# Patient Record
Sex: FEMALE | Race: ASIAN | URBAN - METROPOLITAN AREA
[De-identification: names, ages, dates, MRNs, and addresses within clinical notes are randomized per-mention and may not be internally consistent; named-entity substitution may affect disease eponyms.]

---

## 2018-06-05 ENCOUNTER — APPOINTMENT (RX ONLY)
Dept: URBAN - METROPOLITAN AREA CLINIC 81 | Facility: CLINIC | Age: 38
Setting detail: DERMATOLOGY
End: 2018-06-05

## 2018-06-05 DIAGNOSIS — L68.9 HYPERTRICHOSIS, UNSPECIFIED: ICD-10-CM

## 2018-06-05 PROCEDURE — ? LASER HAIR REMOVAL

## 2018-06-05 ASSESSMENT — LOCATION DETAILED DESCRIPTION DERM
LOCATION DETAILED: RIGHT LATERAL BUCCAL CHEEK
LOCATION DETAILED: LEFT SUPERIOR ANTERIOR NECK
LOCATION DETAILED: RIGHT LOWER CUTANEOUS LIP
LOCATION DETAILED: LEFT LATERAL BUCCAL CHEEK

## 2018-06-05 ASSESSMENT — LOCATION ZONE DERM
LOCATION ZONE: NECK
LOCATION ZONE: LIP
LOCATION ZONE: FACE

## 2018-06-05 ASSESSMENT — LOCATION SIMPLE DESCRIPTION DERM
LOCATION SIMPLE: RIGHT CHEEK
LOCATION SIMPLE: LEFT ANTERIOR NECK
LOCATION SIMPLE: RIGHT LIP
LOCATION SIMPLE: LEFT CHEEK

## 2018-06-05 NOTE — PROCEDURE: LASER HAIR REMOVAL
Pulse Duration: 10 ms
Treatment Number: 0
Spot Size: 12 mm
Consent: Written consent obtained, risks reviewed including but not limited to crusting, scabbing, blistering, scarring, darker or lighter pigmentary change, paradoxical hair regrowth, incomplete removal of hair and infection.
Spot Size: 1.5 mm
Pre-Procedure: Prior to proceeding the treatment areas were cleaned and all present put on their eye protection.
Post-Care Instructions: I reviewed with the patient in detail post-care instructions. Patient should avoid sun for a minimum of 4 weeks before and after treatment.
Fluence (Will Not Render If 0): 20
Render Post-Care In The Note: No
Detail Level: Zone
Post-Procedure Care: Immediate endpoint: perifollicular erythema and edema. Topical steroid ointment applied. Post care reviewed with patient.
Price (Use Numbers Only, No Special Characters Or $): 150
External Cooling: Karyn Cryo 5
Laser Type: Nd:Yag 1064nm
Shaving (Optional): The patient shaved at home
Anesthesia Type: 1% lidocaine with epinephrine
Tolerated Procedure (Optional): Tolerated Well
Fluence (Will Not Render If 0): 30
Cooling: DCD setting

## 2019-05-21 ENCOUNTER — APPOINTMENT (RX ONLY)
Dept: URBAN - METROPOLITAN AREA CLINIC 81 | Facility: CLINIC | Age: 39
Setting detail: DERMATOLOGY
End: 2019-05-21

## 2019-05-21 DIAGNOSIS — L68.9 HYPERTRICHOSIS, UNSPECIFIED: ICD-10-CM

## 2019-05-21 PROCEDURE — ? LASER HAIR REMOVAL

## 2019-05-21 ASSESSMENT — LOCATION ZONE DERM
LOCATION ZONE: NECK
LOCATION ZONE: FACE

## 2019-05-21 ASSESSMENT — LOCATION DETAILED DESCRIPTION DERM
LOCATION DETAILED: LEFT SUPERIOR ANTERIOR NECK
LOCATION DETAILED: RIGHT LATERAL BUCCAL CHEEK
LOCATION DETAILED: LEFT LATERAL BUCCAL CHEEK

## 2019-05-21 ASSESSMENT — LOCATION SIMPLE DESCRIPTION DERM
LOCATION SIMPLE: RIGHT CHEEK
LOCATION SIMPLE: LEFT CHEEK
LOCATION SIMPLE: LEFT ANTERIOR NECK

## 2019-05-21 NOTE — PROCEDURE: LASER HAIR REMOVAL
Price (Use Numbers Only, No Special Characters Or $): 150
Spot Size: 1.5 mm
Pulse Duration: 10 ms
External Cooling Fan Speed: 0
Fluence (Will Not Render If 0): 20
Detail Level: Zone
Post-Procedure Care: Immediate endpoint: perifollicular erythema and edema. Topical steroid ointment applied. Post care reviewed with patient.
Laser Type: Nd:Yag 1064nm
Consent: Written consent obtained, risks reviewed including but not limited to crusting, scabbing, blistering, scarring, darker or lighter pigmentary change, paradoxical hair regrowth, incomplete removal of hair and infection.
Pre-Procedure: Prior to proceeding the treatment areas were cleaned and all present put on their eye protection.
Shaving (Optional): The patient shaved at home
Cooling: DCD setting
External Cooling: Karyn Cryo 5
Post-Care Instructions: I reviewed with the patient in detail post-care instructions. Patient should avoid sun for a minimum of 4 weeks before and after treatment.
Spot Size: 12 mm
Render Post-Care In The Note: No
Fluence (Will Not Render If 0): 30
Tolerated Procedure (Optional): Tolerated Well